# Patient Record
(demographics unavailable — no encounter records)

---

## 2025-06-12 NOTE — DISCUSSION/SUMMARY
[de-identified] : Chief complaint: Chronic right shoulder pain, history of multiple right shoulder dislocations  HPI: Patient is a 49-year-old male who presents the office today for the evaluation of chronic pain to the right shoulder as well as evaluation of a history of many right shoulder dislocations in the past.  Patient reports that approximately 10 years ago while playing softball he had a right shoulder dislocation.  Since that time he reports that he has dislocated the right shoulder approximately 30 times.  His most recent dislocation was 6 months - 1 year ago.  He reports never being evaluated by medical practitioner or requiring hospitalization.  He reports that he reduces the shoulder himself every time it is dislocated.  He realizes that it is dislocated with certain movements.  Denies any fall, injury, or trauma.  He currently works as a .  No reported previous surgical intervention to the right shoulder.  ROS: Positive for right shoulder pain, history of dislocations  Physical examination of the RIGHT shoulder shows: No erythema  No ecchymosis  Skin is intact Active forward flexion of the right shoulder from 0 to  145 degrees, passive forward flexion from 0 to 160 degrees Active horizontal abduction from 0 to 110 degrees, passive horizontal abduction from 0 to 135 degrees Internal rotation to L1 lumbar level No tenderness to palpation Negative O'Briens Test Negative Speed's Positive Ashton / Rober Impingement Test Positive Neer's Test Pain and mild weakness with Empty Can test No appreciable pain or weakness with infraspinatus/teres minor muscle testing  Three-view x-rays of the right shoulder performed in the office today show no obvious acute displaced fracture, subluxation, or dislocation  Assessment/plan: Chronic right shoulder pain, history of multiple right shoulder dislocations  1.  Discussed my recommendation for formal physical therapy with the patient.  Patient was amenable.  A prescription was provided.  Advised the patient to obtain a home exercise program at the direction/discretion of the physical therapist so that additional exercises can be performed at home when not in formal therapy. 2.  Patient can take over-the-counter medication on an as-needed basis for pain/discomfort 3.  Discussed activity modifications with the patient 4.  Ice or heat can be applied to the affected area on an as-needed basis with sensory precautions  Patient will be provided with a 6-week follow-up with me for repeat evaluation, at that time if patient is still having pain/discomfort can consider an MRI of the right shoulder without contrast to rule out internal derangement of the right shoulder, patient verbalized understanding of all findings in the office today, agrees to follow-up as directed